# Patient Record
Sex: MALE | Race: WHITE | Employment: OTHER | URBAN - METROPOLITAN AREA
[De-identification: names, ages, dates, MRNs, and addresses within clinical notes are randomized per-mention and may not be internally consistent; named-entity substitution may affect disease eponyms.]

---

## 2017-01-29 ENCOUNTER — OFFICE VISIT (OUTPATIENT)
Dept: FAMILY MEDICINE CLINIC | Facility: CLINIC | Age: 32
End: 2017-01-29

## 2017-01-29 DIAGNOSIS — R07.89 CHEST WALL PAIN: Primary | ICD-10-CM

## 2017-01-29 PROCEDURE — 99213 OFFICE O/P EST LOW 20 MIN: CPT | Performed by: NURSE PRACTITIONER

## 2017-01-29 RX ORDER — CYCLOBENZAPRINE HCL 10 MG
10 TABLET ORAL 3 TIMES DAILY PRN
Qty: 20 TABLET | Refills: 0 | Status: SHIPPED | OUTPATIENT
Start: 2017-01-29 | End: 2017-03-14 | Stop reason: ALTCHOICE

## 2017-01-29 NOTE — PATIENT INSTRUCTIONS
Use heating to chest wall  Use support pillow  Tylenol/Motrin/Aleve for pain  Follow up with PCP if pain is not better      Chest Contusion    A contusion is a bruise to the skin, muscle, or ribs. It may cause pain, tenderness, and swelling.  It may turn th © 3619-5402 [address], [address] [place]. All rights reserved. This information is not intended as a substitute for professional medical care. Always follow your healthcare professional's instructions.

## 2017-01-29 NOTE — PROGRESS NOTES
CHIEF COMPLAINT:   No chief complaint on file. HPI:   Hardeep Clement is a 27year old male who presents for complaints of chest wall pain, very specific area of pain. Symptoms have been present for 4  days.  Pain is located in the left anterio LUNGS: clear to auscultation bilaterally. No wheezing, rales, or rhonchi. Pt has point tenderness to the left anterior chest wall, worse with a deep breath   CARDIO: RRR without murmur  GI: No visible scars or masses. BS's present x4.   No palpable masses o · You may use acetaminophen or ibuprofen to control pain, unless another pain medicine was prescribed. If you have chronic liver or kidney disease, talk with your health care provider before using these medicines.  Also talk with your provider if [facility] had

## 2017-03-14 ENCOUNTER — OFFICE VISIT (OUTPATIENT)
Dept: FAMILY MEDICINE CLINIC | Facility: CLINIC | Age: 32
End: 2017-03-14

## 2017-03-14 VITALS
SYSTOLIC BLOOD PRESSURE: 120 MMHG | DIASTOLIC BLOOD PRESSURE: 80 MMHG | BODY MASS INDEX: 33 KG/M2 | OXYGEN SATURATION: 99 % | TEMPERATURE: 98 F | WEIGHT: 234 LBS | HEART RATE: 82 BPM

## 2017-03-14 DIAGNOSIS — E16.2 HYPOGLYCEMIA: Primary | ICD-10-CM

## 2017-03-14 PROCEDURE — 99213 OFFICE O/P EST LOW 20 MIN: CPT | Performed by: FAMILY MEDICINE

## 2017-03-14 NOTE — PROGRESS NOTES
HPI:    Patient ID: Claudia Boyer is a 27year old male. Patient presents with:  Tremors: SHAKEY--- FAMILY HISTORY OF THYROID ISSUES AND DIABETES---    HPI pt reports he feels shakey ~ 2-3 hrs after eating breakfast only, irritable,lightheaded and swea on the left side. Bicep reflexes are 2+ on the right side and 2+ on the left side. Brachioradialis reflexes are 2+ on the right side and 2+ on the left side. Patellar reflexes are 2+ on the right side and 2+ on the left side.        Nikhil Miramontes

## 2017-04-03 ENCOUNTER — TELEPHONE (OUTPATIENT)
Dept: FAMILY MEDICINE CLINIC | Facility: CLINIC | Age: 32
End: 2017-04-03

## 2017-05-02 ENCOUNTER — TELEPHONE (OUTPATIENT)
Dept: FAMILY MEDICINE CLINIC | Facility: CLINIC | Age: 32
End: 2017-05-02

## 2017-05-03 NOTE — TELEPHONE ENCOUNTER
FYI----pt concerned about labwork results he had done for a life insurance policy. States his ASLT and AST were 36 and 69, but he's not sure which one was which. He asks why liver enzymes would be elevated?   Discussed possible reasons:  Fatty liver, incr

## (undated) NOTE — MR AVS SNAPSHOT
[facility]  [address] [address]  [phone]               Thank you for choosing us for your health care visit with Marisol Montgomery. Umberto Rush DO.   We are glad to serve you and happy to provide you with this sum

## (undated) NOTE — MR AVS SNAPSHOT
[address] [facility]  [place] [id number] [phone]               Thank you for choosing us for your health care visit with SHARDA Dunn.   We are glad to serve you and happy to provide you wi talk with your health care provider before using these medicines. Also talk with your provider if Manuelita Courser had a stomach ulcer or GI bleeding. Follow-up care  Follow up with your health care provider during the next week, or as advised.   When to seek medica Educational Information     Healthy Diet and Regular Exercise  [employer] [facility] [employer] for making healthy food choices  -   Enjoy your food, but eat less. Fully enjoy your food when eating. Don’t eat while distracted and slow down.    Avoid